# Patient Record
Sex: MALE | Race: WHITE | URBAN - METROPOLITAN AREA
[De-identification: names, ages, dates, MRNs, and addresses within clinical notes are randomized per-mention and may not be internally consistent; named-entity substitution may affect disease eponyms.]

---

## 2021-08-17 ENCOUNTER — OFFICE VISIT (OUTPATIENT)
Dept: URBAN - METROPOLITAN AREA CLINIC 40 | Facility: CLINIC | Age: 21
End: 2021-08-17

## 2021-08-17 RX ORDER — SUMATRIPTAN SUCCINATE 100 MG/1
AS DIRECTED TABLET, FILM COATED ORAL
Status: ACTIVE | COMMUNITY

## 2021-08-17 RX ORDER — ONDANSETRON 4 MG/1
AS DIRECTED TABLET, ORALLY DISINTEGRATING ORAL
Status: ACTIVE | COMMUNITY

## 2021-08-17 RX ORDER — DICYCLOMINE HYDROCHLORIDE 20 MG/1
AS DIRECTED TABLET ORAL
Status: ACTIVE | COMMUNITY

## 2021-08-19 ENCOUNTER — WEB ENCOUNTER (OUTPATIENT)
Dept: URBAN - METROPOLITAN AREA CLINIC 40 | Facility: CLINIC | Age: 21
End: 2021-08-19

## 2021-08-19 ENCOUNTER — OFFICE VISIT (OUTPATIENT)
Dept: URBAN - METROPOLITAN AREA CLINIC 40 | Facility: CLINIC | Age: 21
End: 2021-08-19
Payer: COMMERCIAL

## 2021-08-19 VITALS
WEIGHT: 132 LBS | BODY MASS INDEX: 18.9 KG/M2 | HEART RATE: 70 BPM | TEMPERATURE: 98.1 F | SYSTOLIC BLOOD PRESSURE: 116 MMHG | DIASTOLIC BLOOD PRESSURE: 62 MMHG | HEIGHT: 70 IN

## 2021-08-19 DIAGNOSIS — Z72.89 CURRENT VAPING ON SOME DAYS: ICD-10-CM

## 2021-08-19 DIAGNOSIS — K21.9 GERD: ICD-10-CM

## 2021-08-19 DIAGNOSIS — R10.84 GENERALIZED ABDOMINAL PAIN: ICD-10-CM

## 2021-08-19 DIAGNOSIS — Z79.1 NSAID LONG-TERM USE: ICD-10-CM

## 2021-08-19 DIAGNOSIS — R10.13 MIDEPIGASTRIC PAIN: ICD-10-CM

## 2021-08-19 PROCEDURE — 99203 OFFICE O/P NEW LOW 30 MIN: CPT | Performed by: INTERNAL MEDICINE

## 2021-08-19 RX ORDER — OMEPRAZOLE 40 MG/1
1 CAPSULE 30 MINUTES BEFORE MORNING MEAL CAPSULE, DELAYED RELEASE ORAL ONCE A DAY
Qty: 30 | Refills: 1

## 2021-08-19 RX ORDER — DICYCLOMINE HYDROCHLORIDE 20 MG/1
AS DIRECTED TABLET ORAL BID PRN
Qty: 60 | Refills: 1

## 2021-08-19 RX ORDER — MELOXICAM 15 MG/1
1 TABLET TABLET ORAL ONCE A DAY
Status: ON HOLD | COMMUNITY

## 2021-08-19 RX ORDER — ONDANSETRON 4 MG/1
AS DIRECTED TABLET, ORALLY DISINTEGRATING ORAL
Status: ON HOLD | COMMUNITY

## 2021-08-19 RX ORDER — DICYCLOMINE HYDROCHLORIDE 20 MG/1
AS DIRECTED TABLET ORAL
Status: ON HOLD | COMMUNITY

## 2021-08-19 RX ORDER — OMEPRAZOLE 20 MG/1
1 CAPSULE 30 MINUTES BEFORE MORNING MEAL CAPSULE, DELAYED RELEASE ORAL ONCE A DAY
Status: ACTIVE | COMMUNITY

## 2021-08-19 RX ORDER — SUMATRIPTAN SUCCINATE 100 MG/1
AS DIRECTED TABLET, FILM COATED ORAL
Status: ON HOLD | COMMUNITY

## 2021-08-24 ENCOUNTER — OFFICE VISIT (OUTPATIENT)
Dept: URBAN - METROPOLITAN AREA SURGERY CENTER 30 | Facility: SURGERY CENTER | Age: 21
End: 2021-08-24
Payer: COMMERCIAL

## 2021-08-24 DIAGNOSIS — K29.30 CHRONIC SUPERFICIAL GASTRITIS: ICD-10-CM

## 2021-08-24 PROCEDURE — 43239 EGD BIOPSY SINGLE/MULTIPLE: CPT | Performed by: INTERNAL MEDICINE

## 2021-08-24 PROCEDURE — G8907 PT DOC NO EVENTS ON DISCHARG: HCPCS | Performed by: INTERNAL MEDICINE

## 2021-09-14 ENCOUNTER — OFFICE VISIT (OUTPATIENT)
Dept: URBAN - METROPOLITAN AREA CLINIC 40 | Facility: CLINIC | Age: 21
End: 2021-09-14
Payer: COMMERCIAL

## 2021-09-14 ENCOUNTER — ERX REFILL RESPONSE (OUTPATIENT)
Dept: URBAN - METROPOLITAN AREA CLINIC 40 | Facility: CLINIC | Age: 21
End: 2021-09-14

## 2021-09-14 VITALS
SYSTOLIC BLOOD PRESSURE: 116 MMHG | HEART RATE: 76 BPM | TEMPERATURE: 98.6 F | WEIGHT: 132 LBS | HEIGHT: 70 IN | BODY MASS INDEX: 18.9 KG/M2 | DIASTOLIC BLOOD PRESSURE: 68 MMHG

## 2021-09-14 DIAGNOSIS — R10.13 MIDEPIGASTRIC PAIN: ICD-10-CM

## 2021-09-14 DIAGNOSIS — K21.9 GERD: ICD-10-CM

## 2021-09-14 DIAGNOSIS — R13.10 DYSPHAGIA: ICD-10-CM

## 2021-09-14 DIAGNOSIS — K29.60 OTHER GASTRITIS WITHOUT BLEEDING: ICD-10-CM

## 2021-09-14 PROCEDURE — 99213 OFFICE O/P EST LOW 20 MIN: CPT | Performed by: INTERNAL MEDICINE

## 2021-09-14 RX ORDER — OMEPRAZOLE 40 MG/1
1 CAPSULE 30 MINUTES BEFORE MORNING MEAL CAPSULE, DELAYED RELEASE ORAL ONCE A DAY
Qty: 30 | Refills: 1 | Status: ACTIVE | COMMUNITY

## 2021-09-14 RX ORDER — OMEPRAZOLE 40 MG/1
1 CAPSULE 30 MINUTES BEFORE MORNING MEAL CAPSULE, DELAYED RELEASE ORAL BID
Qty: 60 | Refills: 1 | OUTPATIENT

## 2021-09-14 RX ORDER — MELOXICAM 15 MG/1
1 TABLET TABLET ORAL ONCE A DAY
COMMUNITY

## 2021-09-14 RX ORDER — ONDANSETRON 4 MG/1
AS DIRECTED TABLET, ORALLY DISINTEGRATING ORAL
COMMUNITY

## 2021-09-14 RX ORDER — OMEPRAZOLE 40 MG/1
1 CAPSULE 30 MINUTES BEFORE MORNING MEAL CAPSULE, DELAYED RELEASE ORAL BID
Qty: 60 | Refills: 1

## 2021-09-14 RX ORDER — DICYCLOMINE HYDROCHLORIDE 20 MG/1
AS DIRECTED TABLET ORAL BID PRN
Qty: 60 | Refills: 1 | Status: ACTIVE | COMMUNITY

## 2021-09-14 RX ORDER — OMEPRAZOLE 40 MG/1
1 CAPSULE 30 MINUTES BEFORE MORNING MEAL CAPSULE, DELAYED RELEASE ORAL ONCE A DAY
Qty: 60 | Refills: 1 | OUTPATIENT

## 2021-09-14 RX ORDER — SUMATRIPTAN SUCCINATE 100 MG/1
AS DIRECTED TABLET, FILM COATED ORAL
COMMUNITY

## 2021-09-14 RX ORDER — DICYCLOMINE HYDROCHLORIDE 20 MG/1
AS DIRECTED TABLET ORAL BID PRN
Qty: 60 | Refills: 1
End: 2021-11-13

## 2021-09-14 NOTE — HPI-TODAY'S VISIT:
Mr. Rubin is a 21 year old White male seen 8/19/21 who presents to the office today with a long history of GERD, reflux and he is not consistently taking PPI daily.  Denies any dysphagia or vomiting.  Admits recent,mild electrocution as he works as an .  Is having some right-sided neuropathic pain it seems.  Denies any shortness of breath or chest pain at this time.  No fever or chills.  Normal bowel habits without rectal bleeding.  He has been taking more regularly his meloxicam 15 mg daily for osteoarthritic pain noting prior chronic back pain diagnosis.  States he also has a prescription for hydrocodone which he takes as needed but has not been taking prescribed dicyclomine.  May also be taking antiemetic Phenergan versus prescribed Zofran.  States that he does not drink alcohol or use nicotine but does vape often has been told by his primary care provider to avoid this.  Having some retrosternal and substernal chest discomfort with movement versus with inhalation or ingestion of food.  Seems to hurt more when he moves or bends forward.  Does do lifting with work.  Denies a family history of colon or gastric malignancy.  Overall fair appetite weight stable.  CT A/P in ED with findings of mild periportal edema. Normal gallbladder, liver, pancreas, spleen and bile ducts. Subcentimeter left renal lesion, likely cyst. No abnormal loops of bowel. NO inflammatory changes of the RLQ, appendix not seen. Mildly prominent subcentimeter mesenteric lymph nodes. Possibly reactive vs inflammatory enteritis. Lipase normal at 21 on 8/15/21. CBC essentially normal, CMP normal.  He did have an EGD with Dr. Burt on 2020-08-24 where he was noted a small hernia and diffuse, mild gastritis. The duodenum did appear normal biopsied to rule out celiac disease. Duodenal biopsies unremarkable. No evidence of celiac sprue or infection. Gastric biopsies with mild inflammation, benign. No H. pylori. Symptosms persists despite ppi. Not taking the prescribed Bentyl. Trying to cut back on vaping.

## 2021-10-21 ENCOUNTER — OFFICE VISIT (OUTPATIENT)
Dept: URBAN - METROPOLITAN AREA CLINIC 40 | Facility: CLINIC | Age: 21
End: 2021-10-21

## 2021-10-21 RX ORDER — DICYCLOMINE HYDROCHLORIDE 20 MG/1
AS DIRECTED TABLET ORAL BID PRN
Qty: 60 | Refills: 1 | Status: ACTIVE | COMMUNITY
End: 2021-11-13

## 2021-10-21 RX ORDER — SUMATRIPTAN SUCCINATE 100 MG/1
AS DIRECTED TABLET, FILM COATED ORAL
Status: ACTIVE | COMMUNITY

## 2021-10-21 RX ORDER — MELOXICAM 15 MG/1
1 TABLET TABLET ORAL ONCE A DAY
Status: ACTIVE | COMMUNITY

## 2021-10-21 RX ORDER — OMEPRAZOLE 40 MG/1
1 CAPSULE 30 MINUTES BEFORE MORNING MEAL CAPSULE, DELAYED RELEASE ORAL ONCE A DAY
Qty: 60 | Refills: 1 | Status: ACTIVE | COMMUNITY

## 2021-10-21 RX ORDER — ONDANSETRON 4 MG/1
AS DIRECTED TABLET, ORALLY DISINTEGRATING ORAL
Status: ACTIVE | COMMUNITY

## 2021-11-08 ENCOUNTER — DASHBOARD ENCOUNTERS (OUTPATIENT)
Age: 21
End: 2021-11-08

## 2021-11-08 ENCOUNTER — ERX REFILL RESPONSE (OUTPATIENT)
Dept: URBAN - METROPOLITAN AREA CLINIC 40 | Facility: CLINIC | Age: 21
End: 2021-11-08

## 2021-11-08 RX ORDER — DICYCLOMINE HYDROCHLORIDE 20 MG/1
TAKE 1 TABLET BY MOUTH TWICE A DAY AS NEEDED X 30 DAYS TABLET ORAL
Qty: 60 TABLET | Refills: 2 | OUTPATIENT

## 2021-11-08 RX ORDER — DICYCLOMINE HYDROCHLORIDE 20 MG/1
AS DIRECTED TABLET ORAL BID PRN
Qty: 60 | Refills: 1 | OUTPATIENT

## 2021-11-12 ENCOUNTER — OFFICE VISIT (OUTPATIENT)
Dept: URBAN - METROPOLITAN AREA TELEHEALTH 2 | Facility: TELEHEALTH | Age: 21
End: 2021-11-12

## 2021-11-12 PROBLEM — 40739000 DYSPHAGIA: Status: ACTIVE | Noted: 2021-09-14

## 2021-11-12 PROBLEM — 4556007 GASTRITIS: Status: ACTIVE | Noted: 2021-09-14

## 2021-11-12 PROBLEM — 235595009 GASTROESOPHAGEAL REFLUX DISEASE: Status: ACTIVE | Noted: 2021-08-19

## 2021-11-12 RX ORDER — MELOXICAM 15 MG/1
1 TABLET TABLET ORAL ONCE A DAY
Status: ACTIVE | COMMUNITY

## 2021-11-12 RX ORDER — SUMATRIPTAN SUCCINATE 100 MG/1
AS DIRECTED TABLET, FILM COATED ORAL
Status: ACTIVE | COMMUNITY

## 2021-11-12 RX ORDER — ONDANSETRON 4 MG/1
AS DIRECTED TABLET, ORALLY DISINTEGRATING ORAL
Status: ACTIVE | COMMUNITY

## 2021-11-12 RX ORDER — DICYCLOMINE HYDROCHLORIDE 20 MG/1
TAKE 1 TABLET BY MOUTH TWICE A DAY AS NEEDED X 30 DAYS TABLET ORAL
Qty: 60 TABLET | Refills: 2 | Status: ACTIVE | COMMUNITY

## 2021-11-12 RX ORDER — OMEPRAZOLE 40 MG/1
1 CAPSULE 30 MINUTES BEFORE MORNING MEAL CAPSULE, DELAYED RELEASE ORAL ONCE A DAY
Qty: 60 | Refills: 1 | Status: ACTIVE | COMMUNITY

## 2021-11-12 NOTE — HPI-TODAY'S VISIT:
Mr. Rubin is a 21 year old White male seen 9/14/21 who presents to the office today with a long history of GERD, reflux and he is not consistently taking PPI daily.  Denies any dysphagia or vomiting.  Admits recent, mild electrocution as he works as an .  Is having some right-sided neuropathic pain it seems.  Denies any shortness of breath or chest pain at this time.  No fever or chills.  Normal bowel habits without rectal bleeding.  He has been taking more regularly his meloxicam 15 mg daily for osteoarthritic pain noting prior chronic back pain diagnosis.  States he also has a prescription for hydrocodone which he takes as needed but has not been taking prescribed dicyclomine.  May also be taking antiemetic Phenergan versus prescribed Zofran.  States that he does not drink alcohol or use nicotine but does vape often has been told by his primary care provider to avoid this.  Having some retrosternal and substernal chest discomfort with movement versus with inhalation or ingestion of food.  Seems to hurt more when he moves or bends forward.  Does do lifting with work.  Denies a family history of colon or gastric malignancy.  Overall fair appetite weight stable.  CT A/P in ED with findings of mild periportal edema. Normal gallbladder, liver, pancreas, spleen and bile ducts. Subcentimeter left renal lesion, likely cyst. No abnormal loops of bowel. NO inflammatory changes of the RLQ, appendix not seen. Mildly prominent subcentimeter mesenteric lymph nodes. Possibly reactive vs inflammatory enteritis. Lipase normal at 21 on 8/15/21. CBC essentially normal, CMP normal.  He did have an EGD with Dr. Burt on 2020-08-24 where he was noted a small hernia and diffuse, mild gastritis. The duodenum did appear normal biopsied to rule out celiac disease. Duodenal biopsies unremarkable. No evidence of celiac sprue or infection. Gastric biopsies with mild inflammation, benign. No H. pylori. Symptosms persists despite ppi. Not taking the prescribed Bentyl. Trying to cut back on vaping.

## 2022-02-10 NOTE — PHYSICAL EXAM GASTROINTESTINAL
A Healthy Lifestyle: Care Instructions  Your Care Instructions     A healthy lifestyle can help you feel good, stay at a healthy weight, and have plenty of energy for both work and play. A healthy lifestyle is something you can share with your whole family. A healthy lifestyle also can lower your risk for serious health problems, such as high blood pressure, heart disease, and diabetes. You can follow a few steps listed below to improve your health and the health of your family. Follow-up care is a key part of your treatment and safety. Be sure to make and go to all appointments, and call your doctor if you are having problems. It's also a good idea to know your test results and keep a list of the medicines you take. How can you care for yourself at home? · Do not eat too much sugar, fat, or fast foods. You can still have dessert and treats now and then. The goal is moderation. · Start small to improve your eating habits. Pay attention to portion sizes, drink less juice and soda pop, and eat more fruits and vegetables. ? Eat a healthy amount of food. A 3-ounce serving of meat, for example, is about the size of a deck of cards. Fill the rest of your plate with vegetables and whole grains. ? Limit the amount of soda and sports drinks you have every day. Drink more water when you are thirsty. ? Eat plenty of fruits and vegetables every day. Have an apple or some carrot sticks as an afternoon snack instead of a candy bar. Try to have fruits and/or vegetables at every meal.  · Make exercise part of your daily routine. You may want to start with simple activities, such as walking, bicycling, or slow swimming. Try to be active 30 to 60 minutes every day. You do not need to do all 30 to 60 minutes all at once. For example, you can exercise 3 times a day for 10 or 20 minutes.  Moderate exercise is safe for most people, but it is always a good idea to talk to your doctor before starting an exercise Abdomen , soft, nontender, nondistended , no guarding or rigidity , no masses palpable , normal bowel sounds , Liver and Spleen , no hepatomegaly present , no hepatosplenomegaly , liver nontender , spleen not palpable  program.  · Keep moving. Deanna Hasten the lawn, work in the garden, or [employer]. Take the stairs instead of the elevator at work. · If you smoke, quit. People who smoke have an increased risk for heart attack, stroke, cancer, and other lung illnesses. Quitting is hard, but there are ways to boost your chance of quitting tobacco for good. ? Use nicotine gum, patches, or lozenges. ? Ask your doctor about stop-smoking programs and medicines. ? Keep trying. In addition to reducing your risk of diseases in the future, you will notice some benefits soon after you stop using tobacco. If you have shortness of breath or asthma symptoms, they will likely get better within a few weeks after you quit. · Limit how much alcohol you drink. Moderate amounts of alcohol (up to 2 drinks a day for men, 1 drink a day for women) are okay. But drinking too much can lead to liver problems, high blood pressure, and other health problems. Family health  If you have a family, there are many things you can do together to improve your health. · Eat meals together as a family as often as possible. · Eat healthy foods. This includes fruits, vegetables, lean meats and dairy, and whole grains. · Include your family in your fitness plan. Most people think of activities such as jogging or tennis as the way to fitness, but there are many ways you and your family can be more active. Anything that makes you breathe hard and gets your heart pumping is exercise. Here are some tips:  ? Walk to do errands or to take your child to school or the bus.  ? Go for a family bike ride after dinner instead of watching TV. Where can you learn more? Go to http://www.gray.com/  Enter C090 in the search box to learn more about \"A Healthy Lifestyle: Care Instructions. \"  Current as of: June 16, 2021               Content Version: 13.0  © 6466-3308 [employer].    Care instructions adapted under license by [employer] (which disclaims liability or warranty for this information). If you have questions about a medical condition or this instruction, always ask your healthcare professional. [address] any warranty or liability for your use of this information.